# Patient Record
Sex: MALE | Race: WHITE | NOT HISPANIC OR LATINO | Employment: FULL TIME | URBAN - METROPOLITAN AREA
[De-identification: names, ages, dates, MRNs, and addresses within clinical notes are randomized per-mention and may not be internally consistent; named-entity substitution may affect disease eponyms.]

---

## 2021-09-18 ENCOUNTER — OFFICE VISIT (OUTPATIENT)
Dept: FAMILY MEDICINE | Facility: CLINIC | Age: 56
End: 2021-09-18
Payer: COMMERCIAL

## 2021-09-18 VITALS
TEMPERATURE: 97.9 F | SYSTOLIC BLOOD PRESSURE: 130 MMHG | OXYGEN SATURATION: 98 % | RESPIRATION RATE: 16 BRPM | WEIGHT: 198 LBS | DIASTOLIC BLOOD PRESSURE: 70 MMHG | HEART RATE: 48 BPM

## 2021-09-18 DIAGNOSIS — S93.402A SPRAIN OF LEFT ANKLE, UNSPECIFIED LIGAMENT, INITIAL ENCOUNTER: Primary | ICD-10-CM

## 2021-09-18 PROCEDURE — 99203 OFFICE O/P NEW LOW 30 MIN: CPT | Performed by: PHYSICIAN ASSISTANT

## 2021-09-18 RX ORDER — OMEGA-3 FATTY ACIDS/FISH OIL 300-1000MG
600 CAPSULE ORAL EVERY 4 HOURS PRN
COMMUNITY

## 2021-09-18 NOTE — PATIENT INSTRUCTIONS
You were seen today for a(n) ankle sprain.     Symptom management:  - Rest the injured site  - Ice pads applied 10-15 minutes up to every hour as needed  - Compression with light bandages or brace  - Elevation of the affected area above the chest  - May use ibuprofen to help with swelling and discomfort    If no improvement in symptoms in 1 week, recommend follow-up with your primary care provider for further evaluation.    Reasons to return sooner for re-evaluation:  - Numbness or tingling develops around the site of injury  - Develop severe or worsening pain  - Area becomes blue or cold to the touch

## 2021-09-18 NOTE — PROGRESS NOTES
Assessment & Plan:      Problem List Items Addressed This Visit     None      Visit Diagnoses     Sprain of left ankle, unspecified ligament, initial encounter    -  Primary    Relevant Medications    ibuprofen (ADVIL/MOTRIN) 200 MG capsule        Medical Decision Making  Patient presents with acute onset left ankle pain.  There are no signs of gout or cellulitis on today's examination.  Suspect patient most likely sustained an ankle sprain.  He is able to weight-bear without difficulty and there is no specific trauma, thus concern for fracture is low.  Recommend rest, ice, compression, elevation, and over-the-counter analgesics as needed.  Provided patient with an Ace bandage to help compression.  Discussed expected course of healing.  Discussed signs of worsening symptoms and when to follow-up with PCP if no symptom improvement.     Subjective:      Marcial Torres is a 55 year old male here for evaluation of left ankle pain.  Onset of symptoms was 1 week ago.  Patient was traveling internationally he was doing a lot of increased walking at that time.  He initially developed chest pain through the left midfoot.  That pain is since improved and now most of his pain is at the posterior ankle up the patient's calf.  He does note having history of gout.  Patient has been using ibuprofen 600 to 800 mg 3-4 times a day with good temporary relief of symptoms.  Patient has had to continue to walk on foot despite the pain.  He has noticed some mild to moderate swelling in the left foot, but denies a red rash.     The following portions of the patient's history were reviewed and updated as appropriate: allergies, current medications, and problem list.     Review of Systems  Pertinent items are noted in HPI.    Allergies  No Known Allergies    No family history on file.    Social History     Tobacco Use     Smoking status: Not on file   Substance Use Topics     Alcohol use: Not on file        Objective:      /70 (BP  Location: Right arm, Patient Position: Sitting, Cuff Size: Adult Large)   Pulse (!) 48   Temp 97.9  F (36.6  C) (Oral)   Resp 16   Wt 89.8 kg (198 lb)   SpO2 98%   General appearance - alert, well appearing, and in no distress and non-toxic  Extremities - Left ankle and foot: Full range of motion of left ankle with pain over the Achilles with dorsi flexion; full range of motion of foot joints without difficulty; tenderness to palpation over the Achilles joint only, no other specific point tenderness over the ankle or the foot, no obvious trauma or deformity  Skin - Left ankle and foot: Mild swelling over the medial malleolus and midfoot, no increase from to touch, no erythema, skin intact, no ecchymosis     Lab & Imaging Results    No results found for this or any previous visit (from the past 24 hour(s)).    I personally reviewed these results and discussed findings with the patient.